# Patient Record
Sex: MALE | Race: WHITE | URBAN - METROPOLITAN AREA
[De-identification: names, ages, dates, MRNs, and addresses within clinical notes are randomized per-mention and may not be internally consistent; named-entity substitution may affect disease eponyms.]

---
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
## 2022-04-11 ENCOUNTER — HISTORICAL (OUTPATIENT)
Dept: ADMINISTRATIVE | Facility: HOSPITAL | Age: 22
End: 2022-04-11

## 2022-04-24 VITALS
BODY MASS INDEX: 22.82 KG/M2 | WEIGHT: 137 LBS | HEIGHT: 65 IN | DIASTOLIC BLOOD PRESSURE: 62 MMHG | SYSTOLIC BLOOD PRESSURE: 120 MMHG

## 2022-05-05 NOTE — HISTORICAL [facility]
This is a historical note converted from Michelle. Formatting and pictures may have been removed.  Please reference Michelle for original formatting and attached multimedia. Chief Complaint  Here for Right ring finger f/u, denies any numbness or tingling, states the lower part of finger feels hard. ?Xrays done today.  [1]  History of Present Illness  4/5/2019: Right?ring finger middle phalanx fracture, nonoperative treatment  ?   Returns today.? His been compliant in the splint.  Physical Exam  Vitals & Measurements  BP:?120/62?  HT:?165?cm? WT:?62.14?kg? BMI:?22.82?  Clinically the finger is well aligned. [2]  Assessment/Plan  Finger fracture?S62.029A  ? Continue the splint until May 1.? I would like him to work on range of motion. ?I will see him back in 4 weeks for final radiographs of the right finger  Ordered:  Clinic Follow up, *Est. 05/20/19 3:00:00 CDT, Order for future visit, Finger fracture, [facility]  Post-Op follow-up visit [id number] PC, Finger fracture, [facility], 04/22/19 11:31:00 CDT  ?   Problem List/Past Medical History  Ongoing  Finger fracture  Historical  No qualifying data  Procedure/Surgical History  Application of short leg splint (calf to foot) (08/20/2017)  Immobilization of Left Lower Leg using Splint (08/20/2017)  tonsils and adenoids   Medications  ketoconazole 2% topical shampoo, TOP  Allergies  No Known Allergies  No Known Medication Allergies  Social History  Tobacco  Never (less than 100 in lifetime), No, 04/22/2019  Never (less than 100 in lifetime), N/A, 04/15/2019  Never (less than 100 in lifetime), N/A, 04/08/2019  Never smoker, N/A, 10/30/2018  Never smoker, Household tobacco concerns: No., 02/24/2017  Family History  Family history is negative  Health Maintenance  Health Maintenance  ???Pending?(in the next year)  ??? ??Due?  ??? ? ? ?Adolescent Depression Screening due??04/22/19??and every 1??year(s)  ???Satisfied?(in the past 1 year)  ??? ??Satisfied?  ??? ? ? ?Body  Mass Index Check on??04/22/19.??Satisfied by SYSTEM  ?  ?  Diagnostic Results  Finger radiographs 4/22/2019: 3 views of the finger show unchanged appearance of interval healing     [1]?Office Visit Note; Leon GORDON MD, Nic MOSQUERA 04/15/2019 08:40 CDT  [2]?Office Visit Note; Nic Quintero JR., MD 04/15/2019 08:40 CDT

## 2022-05-05 NOTE — HISTORICAL [facility]
This is a historical note converted from Michelle. Formatting and pictures may have been removed.  Please reference Michelle for original formatting and attached multimedia. Chief Complaint  1 week flu right hand finger fracture w/ xrays. Doing ok. Wearing splint. C/o some pain here and there. Xrays done.  History of Present Illness  4/5/2019: Right?ring finger middle phalanx fracture, nonoperative treatment  ?  Returns today.? His been compliant in the splint.  Physical Exam  Vitals & Measurements  BP:?120/62?  WT:?62.14?kg?  Clinically the finger is well aligned.  Assessment/Plan  Finger fracture  Radiographs relatively unchanged. ?Continue the splint. ?I will see him back next week for repeat radiograph  Ordered:  Clinic Follow up, *Est. 04/22/19 3:00:00 CDT, Order for future visit, Finger fracture, [facility]  Office/Outpatient Visit Level 3 Established [id number] PC, Finger fracture, [facility], 04/15/19 8:39:00 CDT  Phal Finger/thumb fx - ortho fx w/o luis PC, 04/15/19 8:40:00 CDT, [facility], Routine, 04/15/19 8:40:00 CDT  XR Hand Fourth Digit Right Min 2 Views, Routine, 04/15/19 8:02:00 CDT, Fracture, None, Ambulatory, Rad Type, Finger fracture, Not Scheduled, 04/15/19 8:02:00 CDT  ?   Problem List/Past Medical History  Ongoing  Finger fracture  Historical  No qualifying data  Procedure/Surgical History  Application of short leg splint (calf to foot) (08/20/2017)  Immobilization of Left Lower Leg using Splint (08/20/2017)  tonsils and adenoids   Medications  ketoconazole 2% topical shampoo, TOP  Allergies  No Known Allergies  No Known Medication Allergies  Social History  Tobacco  Never (less than 100 in lifetime), N/A, 04/15/2019  Never (less than 100 in lifetime), N/A, 04/08/2019  Never smoker, N/A, 10/30/2018  Never smoker, Household tobacco concerns: No., 02/24/2017  Health Maintenance  Health Maintenance  ???Pending?(in the next year)  ??? ??Due?  ??? ? ? ?Adolescent Depression  Screening due??04/15/19??and every 1??year(s)  ???Satisfied?(in the past 1 year)  ??? ??Satisfied?  ??? ? ? ?Body Mass Index Check on??04/06/19.??Satisfied by SYSTEM  ?  ?  Diagnostic Results  Finger radiographs 4/5/2019:?3 views of the finger show?maintained alignment.

## 2022-05-05 NOTE — HISTORICAL [facility]
This is a historical note converted from Michelle. Formatting and pictures may have been removed.  Please reference Michelle for original formatting and attached multimedia. Chief Complaint  7.5 week flu Right ring finger fracture Non- Op states cant feel finger tip at times. Xrays today.  History of Present Illness  4/5/2019: Right?ring finger middle phalanx fracture, nonoperative treatment  ?   Returns today. He has occasional numbness over the tip of his finger. ?His pains improved.  Physical Exam  He is nontender over the fracture. ?He has extension?to 10degrees short of full extension and flexion to?45 degrees.  Assessment/Plan  Closed fracture of phalanx of right ring finger?S62.604A  Doing well status post above. ?He can resume football. ?I will see him back as needed  Ordered:  Post-Op follow-up visit [id number] PC, Closed fracture of phalanx of right ring finger, [facility], 05/29/19 14:35:00 CDT  XR Hand Fourth Digit Right Min 2 Views, Routine, 05/29/19 14:23:00 CDT, Fracture, None, Ambulatory, Rad Type, Closed fracture of phalanx of right ring finger, Not Scheduled, 05/29/19 14:23:00 CDT  ?  Orders:  Clinic Follow-up PRN, 05/29/19 14:35:00 CDT, Future Order, [facility]  Referrals  Clinic Follow-up PRN, 05/29/19 14:35:00 CDT, Future Order, [facility]   Problem List/Past Medical History  Ongoing  No qualifying data  Historical  No qualifying data  Procedure/Surgical History  Application of short leg splint (calf to foot) (08/20/2017)  Immobilization of Left Lower Leg using Splint (08/20/2017)  tonsils and adenoids   Medications  ketoconazole 2% topical shampoo, TOP  Allergies  No Known Allergies  No Known Medication Allergies  Social History  Alcohol  Never, 05/29/2019  Tobacco  Never (less than 100 in lifetime), No, 05/29/2019  Family History  Family history is negative  Health Maintenance  Health Maintenance  ???Pending?(in the next year)  ??? ??OverDue  ??? ? ? ?Adolescent Depression Screening  due??01/01/19??and every 1??year(s)  ???Satisfied?(in the past 1 year)  ??? ??Satisfied?  ??? ? ? ?Body Mass Index Check on??04/22/19.??Satisfied by SYSTEM  ?  ?  Diagnostic Results  Finger radiographs show healed fracture     [1]?Office Visit Note; Leon GORDON MD, Nic MOSQUERA 04/22/2019 11:31 CDT